# Patient Record
Sex: MALE | Race: WHITE | Employment: FULL TIME | URBAN - METROPOLITAN AREA
[De-identification: names, ages, dates, MRNs, and addresses within clinical notes are randomized per-mention and may not be internally consistent; named-entity substitution may affect disease eponyms.]

---

## 2018-10-10 ENCOUNTER — HOSPITAL ENCOUNTER (OUTPATIENT)
Dept: MRI IMAGING | Age: 48
Discharge: HOME | End: 2018-10-10
Attending: ORTHOPAEDIC SURGERY
Payer: COMMERCIAL

## 2018-10-10 DIAGNOSIS — M54.50 LOWER BACK PAIN: ICD-10-CM

## 2018-10-10 DIAGNOSIS — M79.605 LEFT LEG PAIN: ICD-10-CM

## 2018-10-10 PROCEDURE — A9575 INJ GADOTERATE MEGLUMI 0.1ML: HCPCS | Performed by: ORTHOPAEDIC SURGERY

## 2018-10-10 PROCEDURE — 72158 MRI LUMBAR SPINE W/O & W/DYE: CPT

## 2018-10-10 PROCEDURE — 74011250636 HC RX REV CODE- 250/636: Performed by: ORTHOPAEDIC SURGERY

## 2018-10-10 RX ORDER — SODIUM CHLORIDE 0.9 % (FLUSH) 0.9 %
10 SYRINGE (ML) INJECTION
Status: COMPLETED | OUTPATIENT
Start: 2018-10-10 | End: 2018-10-10

## 2018-10-10 RX ORDER — GADOTERATE MEGLUMINE 376.9 MG/ML
28 INJECTION INTRAVENOUS
Status: COMPLETED | OUTPATIENT
Start: 2018-10-10 | End: 2018-10-10

## 2018-10-10 RX ADMIN — GADOTERATE MEGLUMINE 28 ML: 376.9 INJECTION INTRAVENOUS at 18:51

## 2018-10-10 RX ADMIN — Medication 10 ML: at 18:51

## 2022-08-04 ENCOUNTER — OFFICE VISIT (OUTPATIENT)
Dept: ORTHOPEDIC SURGERY | Age: 52
End: 2022-08-04
Payer: COMMERCIAL

## 2022-08-04 DIAGNOSIS — M21.372 FOOT DROP, LEFT: ICD-10-CM

## 2022-08-04 DIAGNOSIS — M51.36 DDD (DEGENERATIVE DISC DISEASE), LUMBAR: Primary | ICD-10-CM

## 2022-08-04 DIAGNOSIS — M48.061 FORAMINAL STENOSIS OF LUMBAR REGION: ICD-10-CM

## 2022-08-04 DIAGNOSIS — M48.062 LUMBAR STENOSIS WITH NEUROGENIC CLAUDICATION: ICD-10-CM

## 2022-08-04 PROCEDURE — 99214 OFFICE O/P EST MOD 30 MIN: CPT | Performed by: PHYSICIAN ASSISTANT

## 2022-08-04 RX ORDER — INSULIN HUMAN 500 [IU]/ML
INJECTION, SOLUTION SUBCUTANEOUS
COMMUNITY
Start: 2021-09-15

## 2022-08-04 RX ORDER — DULOXETIN HYDROCHLORIDE 60 MG/1
60 CAPSULE, DELAYED RELEASE ORAL DAILY
COMMUNITY
Start: 2021-08-23

## 2022-08-04 RX ORDER — ALBUTEROL SULFATE 90 UG/1
AEROSOL, METERED RESPIRATORY (INHALATION)
COMMUNITY
Start: 2022-06-06

## 2022-08-04 RX ORDER — LISINOPRIL 5 MG/1
TABLET ORAL
COMMUNITY
Start: 2022-05-27

## 2022-08-04 RX ORDER — GABAPENTIN 300 MG/1
CAPSULE ORAL
COMMUNITY
Start: 2022-05-14

## 2022-08-04 NOTE — LETTER
Dale Rivera                                                     AMBER MENDEZ  1970  MRN [id number]                                              ROOM NUMBER______      Radiographic Studies:    Cervical MRI      Thoracic MRI         Lumbar MRI          Pelvis MRI        CONTRAST    CT Myelogram: _______________   NCS/EMG ________________ ( UE  /  LE )     MRI of ___________________          Other: ____________________      Injections:    KNEE    HIP  Depomedrol _____ mg Euflexxa _____    _______________ TFESI/SNRB  _______________ SI Joint  _______________ LARON    _______________ Facet  _______________Piriformis/ Sciatica      Medications:    Oral Steroids _______________  NSAIDS _______________    Muscle Relaxers _______________  Neurontin/Lyrica _______________    Pain Medicine _______________  Other _______________                       Physical Therapy:    Lumbar     Thoracic      Cervical     Hip       Knee       Shoulder               Traction          Ultrasound          Dry Needling      Referral:    Pain referral:  [facility]   [facility]   Other: ______________________________    Follow-up/ Refer__________________________________________________    Authorization to hold blood thinners:___________________________________

## 2022-08-04 NOTE — PATIENT INSTRUCTIONS
Epidural Steroid Injection / Selective Nerve Root Block  What is it? An epidural steroid injection (LARON) is an injection of an anti-inflammatory medication directly on the sac that covers the spinal cord and nerves. A Selective Nerve Root Block (SNRB) is an injection that is directed around a specific nerve. Your physician usually orders an injection  when you have symptoms of an irritated spinal nerve. These symptoms can include back, buttock or leg pain, weakness, or numbness. Irritated spinal nerves are often caused by disc herniations or a tight spinal canal (stenosis). The goal of the steroid injection is to reduce the inflammation around the spinal cord and nerves, which should reduce the amount of back and leg pain you are experiencing. Epidural injections are often done in series. It would not be unusual to have two or three injections in a row 10 to 14 days apart. The reason for multiple injections is that the relief from the injection may wear off over time. And sometimes it takes multiple doses of steroid injection to obtain the most anti-inflammatory effect around the nerves. How is it performed? You will be asked to lie on your side or stomach on the x-ray table. This will allow the physician to position you in the best way possible to access your back. Next, the skin will be cleaned and numbed with a local anesthetic. An X-ray machine will then be used to guide a small gauge needle into the space over your spinal sac. A small amount of dye will then be injected to insure the needle is in the proper position. Finally, a mixture of numbing medicine and anti-inflammatory (steroid/cortisone) will be injected. How long does it take? All together it should take about 1 hour. The back and legs may feel weak or numb for a couple of hours after the injection. Plan the have someone drive you home. Are there any restrictions after the LARON?    Try to spend the remainder of the evening resting as much as possible. You may return to your normal activities the day after the procedure, including returning to work. What are the risks? The most common risk is a spinal headache. This happens when the needle passes through the spinal sac and can result in leakage of spinal fluid. This is usually treated with lying in a flat position and high fluid intake. Rarely, spinal headaches lasting more than a few days can be treated with a small procedure called a blood patch. Another risk, though very small, is the injection of the medication into one of the tiny blood vessels in the spinal canal.  This can result in serious complications such as seizures, cardiac arrest and even death. Fortunately, these complications are quite rare. Other rare complications include infection, bleeding into the spinal canal (epidural hematoma), loss of bladder control, and injury to a nerve with the needle. Who should not have an LARON? The injection of a steroid anywhere in the body can cause a significant increase in the blood sugar level. Diabetics are very sensitive to steroids and should have them administered with caution. Diabetic patients can have injections but need to watch their blood sugar after the injection and discuss with their Primary Care Physician a plan to manage elevations in blood sugar. Steroids also decrease the body's ability to fight infection. Thus, any person with an active infection should not take a steroid medication until the infection has been cleared completely. If you are taking an antibiotic for an active infection, please notify our office.    Additionally, some patients may have anatomic abnormalities or have had previous back surgeries which may not allow the needle to pass into the spinal canal.     Medications that result in thinning of the blood such as coumadin, aspirin, Plavix, Eliquis, Xarelto and many anti-inflammatory medications need to be discontinued 5-7 days prior to the injection. Check with your doctor regarding specific drugs you are taking. Med    [facility]    MD Jason Mark MD Amy Leitha Lawn, PA-C Flo Mina, NP    Main Office  [address] [facility], [facility] [place] [facility], [facility] [address]        For Appointments at either location, please call [phone] that result in thinning of blood such as Coumadin, Aspirin, Plavix, and many anti-inflammatories, need to Pre procedure teaching sheet: Epidural spinal injection, selective nerve root block injection, sacroiliac injection and facet block injections. You have been scheduled for spinal injection. This injection is to help decrease her back and or leg pain. A local anesthetic will be used to numb the area. Then, a spinal needle will be placed in the appropriate place according to the type of injection ordered by your physician. A steroid with or without local anesthetic will be injected. A small amount of contrast dye will be used to better visualize the injection site. You will be lying on your stomach. A series of 3 injections may be performed as these are ordered 2 to 3 weeks apart. Be aware, steroids can take 2 to 3 days to begin working, but can take 7 to 10 days for full effectiveness. Therefore, you may not have relief immediately. Please be patient and give the injection time to work. You should not resume any type of strenuous workout routine for at least 3 days following the procedure. Walking is fine. Prior to your procedure    - Please call your primary care physician if you are on blood thinners such as Coumadin, warfarin, heparin, Plavix, Lovenox, Effient, Eliquis, Xarelto, Brilinta, or aspirin or other blood thinner as these will need to be stopped for 3 to 7 days before the injections.   We will need verbal approval to stop the thinners and proceed with the injection from the physician treating you with the blood thinners prior to the appointment date. If your physician tells you it is not safe to stop the blood thinner, you must call our office and discuss this with us. We may need to cancel the procedure. - Please do not take any nonsteroidal anti-inflammatory medications (NSAIDs) such as Advil, ibuprofen, Celebrex, meloxicam or Aleve for 3 days before your injection.    - We cannot give you an injection if you are presently taking an antibiotic. - Please continue your other medications as prescribed. -You must bring someone to drive you home. - You may eat prior to your procedure, but we asked that you do not eat a heavy meal within 2 to 4 hours of your procedure. You may drink liquids up to 1 to 2 hours before your appointment time. - If you are diabetic, please adjust your medications as appropriate. If steroid is used be aware that they may increase your blood sugar. Closely monitor your blood sugars after the procedure. - If you are sick, running a fever, have a virus or are put on antibiotics during the week before your procedure, please call to reschedule. We cannot do injections if you are sick. Day of procedure    - Arrive 15 minutes before your procedure time, register at the . - A staff member will call you from the waiting area and bring you to the exam room. - You may or may not be asked to change into shorts. Please leave valuables at home. If you wear clothing with elastic waist, you will not be required to change. - The nurse will talk with you and have you sign a consent form before your injection. If you have any allergies to Betadine, iodine, shellfish or x-ray dye, please tell the nurse at this time. - You will be positioned on the table on your stomach. A special x-ray machine is used to denny the correct position of the needle. We will clean the area with Betadine or Hibiclens.   Sterile towels were placed around the area to be injected. - The doctor will use a local anesthetic to numb the skin. This burns like a bee sting for about 20 seconds. As the needle is advanced, you will feel pressure. - Once the needle is in the appropriate space, the medication will be injected. Once the needle is removed, your back will be cleaned. You will move back to the exam room. - You are required to stay 20 to 30 minutes following the procedure. Although not expected you may have some numbness from the local anesthetic. If this were to interfere with her walking, you will not be discharged from the office until it is safe for you to leave. - Your discharge instructions and follow-up care will be discussed with you prior to leaving the office.           PRODUCTS THAT MAY THIN THE BLOOD    Medications, over-the-counter medications and herbal supplements    Aches-N-Pain    Disalcid   Meclenofenamate   Plavix  Acetylsalicylic acid (ASA)  Breezy's Pills   Meclomen    Ponstel  Actron     Dolobid   Medipren    Relefen  Advil     Dristan    Mefenamic acid   Robaxisal  Aleve     Ecotrin    Meloxicam    Rufen  Niesha-Weldon    Empirin   Menadol    Saleto  Anacin     Equagesic   Methocarbamol   Salsalate  Anaprox    Etodolac   Midol     Sine-aid  Ansaid     Excedrin   Mobic     Sine-off  Arthritis Pain formula   Feldene   Motrin     Sominex  Ascriptin    Fenoprofen   Nabumetone    Stanback  Aspergum    Feverfew   Nalfon     Sulindac  Aspirin     Florinal   Naprosyn    Talwin Compound  Santy     Flurbiprofen   Naproxen    Ticlid  BC Powders    Genpril    Norgesic    iclopidine  Bufferin    Goody's   Nuprin     Tolectin  Cataflam    Haltrin    Nyquil     Tolmetin  Clinoril     IBU    Orudis     Toradol  Clopidogrel    Ibuprofen   Oruvail     Trental  Coricidin    Indocin    Oxaprozin    Triclosan  Coumadin    Indomethacin   Pamprin    Vanquish  Darvon Compound   Ketoprofen   Pepto Bismol    Vitamin - E  Daypro     Ketorolac   Percodan    Voltaren  Diflunisal Kaopectate   Lovenox   Piroxicam    Warfarin    Diclofenac Lodine       Phenaphen    Xarelto  Eliquis       Enoxaparin

## 2022-08-09 ENCOUNTER — OFFICE VISIT (OUTPATIENT)
Dept: ORTHOPEDIC SURGERY | Age: 52
End: 2022-08-09
Payer: COMMERCIAL

## 2022-08-09 DIAGNOSIS — M54.17 LUMBOSACRAL RADICULOPATHY: Primary | ICD-10-CM

## 2022-08-09 PROCEDURE — 62323 NJX INTERLAMINAR LMBR/SAC: CPT | Performed by: PHYSICAL MEDICINE & REHABILITATION

## 2022-08-09 RX ORDER — TRIAMCINOLONE ACETONIDE 40 MG/ML
200 INJECTION, SUSPENSION INTRA-ARTICULAR; INTRAMUSCULAR ONCE
Status: COMPLETED | OUTPATIENT
Start: 2022-08-09 | End: 2022-08-09

## 2022-08-09 RX ADMIN — TRIAMCINOLONE ACETONIDE 200 MG: 40 INJECTION, SUSPENSION INTRA-ARTICULAR; INTRAMUSCULAR at 11:01

## 2022-08-09 NOTE — PROGRESS NOTES
Date: 08/09/22   Name: Marbin Babcock    Pre-Procedural Diagnosis:    Diagnosis Orders   1. Lumbosacral radiculopathy  XR INJ SPINE THER SUBST LUM/SAC W IMG          Procedure: Lumbar Epidural Steroid Injection (LARON)    Precautions: [facility] Precautions spine injections: None. Patient denies any prior sensitivity to steroid, local anesthetic, contrast dye, iodine or shellfish. The procedure was discussed at length with the patient and informed consent was signed. The patient was placed in a prone position on the fluoroscopy table and the skin was prepped and draped in a routine sterile fashion. The area to be injected was anesthetized with approximately 5 cc of 1% Lidocaine. Initially a 22-gauge 3.5 inch inch spinal needle was carefully advanced under fluoroscopic guidance to the left L3-L4 epidural space. At this time 0.25 cc of omnipaque administered. . Once proper placement was confirmed, 3 cc of sterile water and 100 mg of Kenalog were injected through the spinal needle. Fluoroscopic guidance was used intermittently over a 10-minute period to insure proper needle placement and patient safety. A hard copy of the fluoroscopic  images has been placed in the patient's chart. The patient was monitored after the procedure and discharged home without complication.      Resume Meds:  N/A    Nirav Chowdary MD  08/09/22

## 2022-09-09 ENCOUNTER — OFFICE VISIT (OUTPATIENT)
Dept: ORTHOPEDIC SURGERY | Age: 52
End: 2022-09-09
Payer: COMMERCIAL

## 2022-09-09 DIAGNOSIS — M48.062 LUMBAR STENOSIS WITH NEUROGENIC CLAUDICATION: Primary | ICD-10-CM

## 2022-09-09 DIAGNOSIS — M48.061 FORAMINAL STENOSIS OF LUMBAR REGION: ICD-10-CM

## 2022-09-09 DIAGNOSIS — M51.36 DDD (DEGENERATIVE DISC DISEASE), LUMBAR: ICD-10-CM

## 2022-09-09 PROCEDURE — 99213 OFFICE O/P EST LOW 20 MIN: CPT | Performed by: PHYSICIAN ASSISTANT

## 2022-09-09 NOTE — LETTER
Marbin Babcock                                                     AMBER MENDEZ  1970  MRN [id number]                                              ROOM NUMBER______      Radiographic Studies:    Cervical MRI      Thoracic MRI         Lumbar MRI          Pelvis MRI        CONTRAST    CT Myelogram: _______________   NCS/EMG ________________ ( UE  /  LE )     MRI of ___________________          Other: ____________________      Injections:    KNEE    HIP  Depomedrol _____ mg Euflexxa _____    _______________ TFESI/SNRB  _______________ SI Joint  _______________ LARON    _______________ Facet  _______________Piriformis/ Sciatica      Medications:    Oral Steroids _______________  NSAIDS _______________    Muscle Relaxers _______________  Neurontin/Lyrica _______________    Pain Medicine _______________  Other _______________                       Physical Therapy:    Lumbar     Thoracic      Cervical     Hip       Knee       Shoulder               Traction          Ultrasound          Dry Needling      Referral:    Pain referral:  [facility]   [facility]   Other: ______________________________    Follow-up/ Refer__________________________________________________    Authorization to hold blood thinners:___________________________________

## 2022-09-09 NOTE — PROGRESS NOTES
Name: Mikie Vargas  YOB: 1970  Gender: male  MRN: [id number]    CC: Back Pain (Follow up after injection with [facility] 8/9/22)       History of Present Illness: This is a very pleasant 45 y.o. male who had lumbar laminectomy L1-L5 with Dr. Roseann Bermeo in 2016. He has had some chronic left foot drop and calf atrophy since the surgery. He had Covid January 2022. Coughing and sneezing exacerbated pain in his lower backon the right side of the lower back radiates down the right lateral leg and up into the medial thigh. We ordered a new MRI scan of the lumbar spine. He has multiple levels of degenerative changes and disc bulging with foraminal and central stenosis. L2-3 there is facet arthropathy no significant central stenosis but there is severe foraminal narrowing bilaterally. L3-4 there is impressive lateral recess stenosis and severe left moderate to severe right laminal narrowing. L4-5 is an area where he has had a laminectomy there is still facet arthropathy no central stenosis but moderate severe left and mild right foraminal narrowing and at L5-S1 no central stenosis but severe left and moderate right foraminal narrowing. We felt his symptoms were likely coming from the L2-3 and 3 4 foraminal stenosis. We referred him for a right L2 and 3 selective nerve root block. He received 80% relief from the right L2 and 3 selective nerve root block. The symptoms returned but worse in bilateral hips. Referred him for second injection. Injection was L3-4 epidural steroid injection. 3/25/22 this injection provided even more relief and help the lower back more. Relief lasted at least 3 months. He had a 3rd injection repeating L3-4 epidural steroid injection August 9, 2022. It took about a week for this injection to be effective than he does receive 80% relief of his pain. He does not have constant pain. He has numbness still in the legs. He is not very active.   I did previously discuss chronic. He does not have any increased weakness of the left lower extremity on exam from previous. No decrease sensation noted. Radiographic Studies:     X-rays including AP and lateral views of the lumbar spine were reviewed: 2/10/22  AP of the lumbar spine shows listing to the left. Multilevel degenerative changes with lateral osteophytes present. Sagittal view of the lumbar spine reveals multilevel degenerative disc changes. Prior laminectomy is noted the L5 spinous process is still intact. He does have degenerative disc at L5-S1. There is wedging of the L1 vertebra. This may be slightly increased wedging since the 2020 x-rays. Certainly degenerative changes at L5-S1 are more prominent. X-ray impression: Advanced degenerative changes lumbar spine. Lumbar spondylosis. Reviewed the prior MRI scan of his lumbar spine from 2012 18. At that time the L5-S1 the left foramen had severe foraminal narrowing. The right L4 was moderate. But the right L5 really had no foraminal stenosis. MRI Results (most recent):  Results from Appointment encounter on 02/17/22    MRI LUMB SPINE WO CONT    Narrative  MR OF THE LUMBAR SPINE WITHOUT CONTRAST. Clinical Indication: Lower back pain with acute right leg pain that extends into  the foot    Procedure: Multiplanar and multisequence MR images are performed of the lumbar  spine without gadolinium contrast.    Comparison: No prior    Findings: The lumbar vertebral bodies are normal in height and signal. There is  no compression fracture. Multilevel degenerative disc signal appreciated with  disc space narrowing most notable at L4-L5. Prominent posterior disc protrusion  is noted at L1-L2 and L2-L3. The conus medullaris is normal in morphology and  signal terminating in the expected position at T12-L1. No aggressive bone  lesions identified.     Axial images:    T12-L1: A circumferential disc bulge slightly bulkier on the left with left  greater than right degenerative facet arthropathy. There is no central stenosis. There is moderate to severe left foramina narrowing. The right neural foramen is  patent. L1-L2: A circumferential disc bulge with bilateral degenerative facet  arthropathy. Bilateral laminectomy defects are present. There is no central  stenosis. There is moderate to severe right foramina narrowing. There is mild  left foramina narrowing. L2-L3: Bilateral laminectomy defects are present. There is a circumferential  disc bulge with a shallow posterior disc protrusion. Bilateral degenerative  facet arthropathy is present. There is no central stenosis. Severe bilateral  foramina narrowing is present. L3-L4: Bilateral laminectomy defects are present. There is bulky degenerative  facet arthropathy with a circumferential disc bulge. There is complete  effacement of the subarticular zones with crowding of the central nerve roots. There is severe left and moderate to severe right foramina narrowing. L4-L5: Bilateral laminectomy defects are present. Bilateral degenerative facet  arthropathy is noted. There is a slight circumferential disc bulge. No central  stenosis evident. Moderate to severe left and mild right foramina narrowing are  present. L5-S1: A circumferential disc bulge with bilateral degenerative facet  arthropathy. No central stenosis. There is severe left and moderate right  foramina narrowing. Limited evaluation the paraspinal soft tissues is unremarkable. Impression  1. Extensive multilevel degenerative disc and facet arthropathy not  significantly change from the prior exam with posterior decompression  laminectomy changes as described. No evie central stenosis evident. 2. There is complete effacement of the bilateral subarticular zones at L3-L4  with crowding of the central nerve roots. 3. Bilateral foramina narrowing of varying severity at multiple levels as  discussed in detail above.   I have independently reviewed the MRI scan of the lumbar spine. He has multiple levels of degenerative changes and disc bulging with foraminal and central stenosis. L2-3 there is facet arthropathy no significant central stenosis but there is severe foraminal narrowing bilaterally. L3-4 there is impressive lateral recess stenosis and severe left moderate to severe right laminal narrowing. L4-5 is an area where he has had a laminectomy there is still facet arthropathy no central stenosis but moderate severe left and mild right foraminal narrowing and at L5-S1 no central stenosis but severe left and moderate right foraminal narrowing. Assessment/Plan:      ICD-10-CM    1. Lumbar stenosis with neurogenic claudication  M48.062       2. Foraminal stenosis of lumbar region  M48.061       3. DDD (degenerative disc disease), lumbar  M51.36            This patient's clinical history and physical exam is consistent with neurogenic claudication which is likely due to lumbar stenosis. He has multilevel stenosis central and foraminal most prominent at L1-L3 4. Symptoms currently seem to be more of a left L4 radiculopathy which is concordant with the stenosis at L3-4. He did get relief with L3-4 epidural steroid injection.       - Patient will call for lumbar steroid injection and we will schedule a L3-4 epidural steroid injection    We also discussed the role of surgery and if interested I will refer him to Dr. Roseann Bermeo. No orders of the defined types were placed in this encounter. No orders of the defined types were placed in this encounter. 4 This is a chronic illness/condition with exacerbation and progression      Return for call for injection. Ruby Trejo PA-C  09/09/22      Elements of this note were created using speech recognition software. As such, errors of speech recognition may be present.